# Patient Record
(demographics unavailable — no encounter records)

---

## 2024-11-29 NOTE — PHYSICAL EXAM
[Normal] : normal [None] : none [Avoidant] : avoidant [WNL] : within normal limits [Cooperative] : cooperative [Euthymic] : euthymic [Constricted] : constricted [Underproductive] : underproductive [Poverty of content] : poverty of content [Positive interaction] : positive interaction [FreeTextEntry7] : not enough to assess [de-identified] : not enough to assess [de-identified] : not enough to assess [de-identified] : not enough to assess [de-identified] : not enough to assess [de-identified] : not enough to assess

## 2024-11-29 NOTE — DISCUSSION/SUMMARY
[Low acute suicide risk] : Low acute suicide risk [No] : No [No, Reason: ____] : Safety Plan completed/updated (for individuals at risk): No, Reason: [unfilled]

## 2024-11-29 NOTE — RISK ASSESSMENT
[Collateral Sources] : Collateral Sources [None Known] : none known [No known risk factors] : No known risk factors [No known protective factors] : No known protective factors [No] : no

## 2024-11-29 NOTE — REASON FOR VISIT
[Behavioral Health Urgent Care Assessment] : a behavioral health urgent care assessment [School] : school [Patient] : patient [Mother] : mother

## 2024-11-29 NOTE — HISTORY OF PRESENT ILLNESS
[Not Applicable] : Not applicable [FreeTextEntry1] : Patient is a 5 year old male, domiciled with mom, dad, and 3 year old younger brother, attending kindergarden at [employer] in a self contained class, 15:1:1 additionally patient has a 1:1 aide (formerly attended [employer] pre k from ages 3-4), with PPHx significant for autism diagnosed at age 2, received DENA therapy in home from age 18 months to 3, had speech therapy three times a week through school starting at 18 months, started OPWDD 6 months ago PMH significant for spontaneous idiopathic urticaria, taking zysol 2.5 mg liquid at night time, no allergies, PSH, FH significant for autism in younger brother, father's aunt / uncle has possible hx of schizophrenia, referred by school psychologist, Eric Johansen,  for onset of behavioral issues such as patient having rigid behaviors and spitting / kicking as a result of changing routines as a result of the patient's autism.   Patient today presents with patient. Mom states that the main concerns are currently with the school. Mom states that at the moment she is looking for more support / resources to help the patient especially with the autism. Mom states that the patient had early intervention when he was 18 months old when they found out that he had a stimming behavior of needing to "spin" things, had issues with eye contact as well during this time (now resolved, but stimming now through "psychedelic videos on youtube". Mom states that he was officially diagnosed autism at age 2 through the ADOS and then had a repeat ADOS done 6 months ago showing the same results. Mom states that at 18 months of age the patient also had suffered from speech delay and then started to receive speech therapy. Patient did not babble until 2.5 years old and did not speak full words till 3. Mom states that the patient still struggles saying a limited vocabulary, but states that he is able to read a book aloud, but that he cannot recall what happened. Mom states that the patient in general does not have good recall and would not be able to recall things he was told nor his general day. Mom states that at around age 18 months he started to recieve DENA in home therapy that was partially helpful (though mom states that the therapists did not seem that great) until the patient started pre K. Mom states that at age 3 the patient started prek at [employer], and there were no behavioral issues at that time. Mom states that for kindergarden the patient did not meet criteria for continued BOSCES as he was determined to be likely at a higher function and was switched to his current school. Mom states that the patient generally has rigid behavior especially at home, and would have difficulty understanding rules / boundaries. Mom gives an example of patient coming back from school bus and wanting to look at mom's phone, when mom refused the patient threw a tantrum out of anger, would bite, kick and spit.   Mom states that through the school she receives parent interaction training twice a month (one for each child). Mom states that she would try to use the words / actions learned in training to deescalate the patient, but if the patient has a full melt down that the patient would not able to calm himself. Mom denies patient appearing sad or stressed, nor significant attention issues.   Mom states that the patient's brother also has an autism diagnosis but that the motor issues are more of a problem and that the brother is more social. Mom states that the brother gets along with the patient. Mom states that home life is stable, and denies hx of trauma.  Mom denies the patient appearing more depressed, the patient is generally a picky eater and avoids vegetables but for the most part has a healthy diet. Mom states that besides the idiopathic urticaria that is well controlled with 2.5 mg of liquid zysol that there is no medical concerns at this time. Mom states that during the pregnancy that he was born around 36 weeks, slightly heavy at around 7 lbs, and recieved 1 day of phototherapy but states that there were no other significant physical concerns aside from the speech issues later in life.  Mom states that the patient and his brother receive help through OPWDD but that they refuse to pay for anything medical but will pay for things like sports / museum passes.  Patient when interviewed as able to make eye contact briefly, but would only say 1 word, occasionally three words, but none of them were full grammatical sentences. Patient seemed mostly fixated on playing with the toys in the room, and particularly liked spinning toys. Patient was able to walk without issue in the room. Patient was able to name colors and objects. patient could draw a [place]angin but not a triangle. [FreeTextEntry2] : DENA therapy from 18 months to 3, then [facility] elizabeth from 3-4 which had DENA like class room incorporation [FreeTextEntry3] : none

## 2024-11-29 NOTE — SOCIAL HISTORY
[FreeTextEntry1] : Mom states that the patient's brother also has an autism diagnosis but that the motor issues are more of a problem and that the brother is more social. Mom states that the brother gets along with the patient. Mom states that home life is stable, and denies hx of trauma. [No Known Substance Use] : no known substance use

## 2024-12-24 NOTE — PHYSICAL EXAM
[NL] : moves all extremities x4, warm, well perfused x4 [Tired appearing] : not tired appearing [Lethargic] : not lethargic [Toxic] : not toxic [Erythema] : no erythema [Enlarged Tonsils] : tonsils not enlarged [Vesicles] : no vesicles [Exudate] : no exudate [Ulcerative Lesions] : no ulcerative lesions [Palate petechiae] : palate without petechiae [Wheezing] : no wheezing [Rales] : no rales [Crackles] : no crackles [Transmitted Upper Airway Sounds] : no transmitted upper airway sounds [Rhonchi] : no rhonchi [Soft] : soft [Tender] : nontender [de-identified] : CLUSTERED ERYTHEMATOUS LESION ON OUTER CORNERS OF LIPS, NO DRAINAGE OR SWELLING

## 2024-12-24 NOTE — REVIEW OF SYSTEMS
[Fever] : fever [Nasal Congestion] : nasal congestion [Sore Throat] : sore throat [Negative] : Genitourinary [Chills] : no chills [Malaise] : no malaise [Difficulty with Sleep] : no difficulty with sleep [Ear Pain] : no ear pain [Rash] : no rash

## 2024-12-24 NOTE — PLAN
[TextEntry] : START HSV ANTIVIRAL AS DISCUSSED -- RETURN IF NOT BETTER/WORSE  Rapid Strep NEGATIVE.  IF T/C + MAY CHANGE ABX TO AUGMENTIN TO RETREAT/COVER FOR STREP   Discussed to continue 6.5 ml of amoxicillin already prescribed, take entirety of dose   Symptomatic treatment. Maintain adequate hydration & rest. Warm Mist humidifier in room.  Nasal suctioning PRN if applicable Any fever >5 days, return to office. Stressed hand washing and infection control Pay close observation for new or worsening symptoms. Manage discomfort/fever as needed with OTC Acetaminophen and Ibuprofen (only if older than 6 months)   Instructed to return to office if condition worsens or new symptoms arise.

## 2024-12-24 NOTE — HISTORY OF PRESENT ILLNESS
[de-identified] : Mom reports strep in the house. She reports she was given Amoxicillin for Steve and he has been on it since Saturday prophylactically. Congestion and sneezing reported. New fever last night and night before per mom (100.5) at night. Decreased appetite and normal voiding. Last Tylenol at 4am. Mom also concerned about raw left hand knuckles. Wants checked for staph infection. Hand has been raw and irritated x3 days per mom. [FreeTextEntry6] : fever 104 and 100.5F TODAY, nasal congestion been on amox for few days for suspected strep, no rapid test done, mother and sibling + for strep few doses were missed from spitting out   no respiratory distress, no wheezing or dyspnea. No rashes, no lethargy No abdominal pain, no vomiting or diarrhea, stooling normally. Voiding normally, eating and drinking well.  GETS acyclovir for cold sores, has active breakout, not able to tolerate Abreeva -- licks it off, HAS HX OF COLD SORE/HSV LESIONS ON MOUTH, ESPECIALLY WHEN HE HAS VIRAL ILLNESS

## 2025-03-31 NOTE — REASON FOR VISIT
[FreeTextEntry1] :   Introduction:  I had the pleasure of seeing Steve Morrell (Charlie), age ~6.4 years, for a consultation.  Adán was accompanied today by his mother and grandmother.    HISTORY OF PRESENTING CONCERNS:    Concerns noted on our intake paperwork include:   -Autism management -behavioral issues -sensory input -OCD concern   Today's Concerns:   -ASD management -OCD type behaviors  Autism concern: -when younger sat in corner spinning things - generally not spinning now -atypical speech style -some echolalia and scripting type speech -at times over stimulated and may pinch/bite his brother - not trying to hurt him but excited -Eye contact: weak when younger, improving -Responding to name: reduced, improving -Shared enjoyment: reduced when younger, now at times - mostly with adults -Social interest: more interested in adults than peers, interested in being around peers, has some preferred peers but doesn't engage very much -Visual inspection: inspects -Lining up/sorting: when younger, generally not now -unusual visual regard: demonstrates -some body tensing/flapping when excited -no vocal stimming -Joint attention: reduced in the past, improving -some waving fingers/flicking -Play: plays with toys at times in intended manner - may drive cars around but once bored may repetitively drop on floor to hear the sound -Sensory: headphones when needed for some singing or loud toilet - wears alot in school. When younger disliked crying babies. Seeks out tickles. No major sensory tactile aversions -focuses on wheels/spinning items  OCD type behaviors: -wants people to sit in particular places in school or at home - gets upset if not -set schedule - if schedule changes gets very upset -rigid routines -if upset due to change in schedule, cry, throwing things, spitting, throwing self on floor, hitting/kicking anyone nearby - at times able to divert if notice him getting upset - 'lets go for a walk' - may help him calm. If gets upset can remain for 30 minutes. In school beginning to use prior -negatively impacting him in class - often meltdowns as peers not doing what he wants him to do, hard time getting through the day due to the meltdowns.  OPWDD: -approved -dov program - [facility], soft ball  Private services: -Home DENA - to begin - 10 hours - Achie[facility] behavior services  Safety:  -airtag -tries getting out of house, parents have locks on doors  Language/Communication:  -"All done tickles"  -speaks in multiword utterances, grammatical errors, speaks in 3rd person -able to follow multiple step instruction  Behavioral:  -generally compliant  Emotional:  -fair    ADAPTIVE FUNCTIONING:    Self-Care:  -tries dressing himself, may put on in wrong direction  Toileting:  -trained   Feeding:  -picky eater, nuggets, fries, cheese and bread, fruits. No vegetables. No smoothies, soups/stews -inspects each bit to make sure no spots or other food on the bite -very particular regarding the food - shapes, etc  Sleep:  -sleeps well   EDUCATIONAL HISTORY/INTERVENTIONS:  Academics:  -hyperlexic, diff responding to comprehension question, reads Dr. Rai books -good w/ math - can do mental addition, subtraction. Perhaps some multiplication  School Name: [employer] Grade: K Services:  -1:1 but not really in place although many aides in the classroom -SC 15:1 -SLT -OT -PT -ESY  *IEP (2025): -SC 15:1 -SLT ind and group -Parent training -OT -PT -Beh intervention services: 1 hr X  2 X / wk  PREVIOUS ASSESSMENTS/SERVICES:    *EI (18 months):   -DENA -SLT  *CPSE:   -ALISIA program- maybe 8 or 10   MEDICAL HISTORY:    Current Medications:   -Xyzal for urticaria  Medication History:  -none for behavior  Allergies:  -none    Birth History:   -born at 36 wks, 7lbs -insulin dependent DM, hypothyroid, fat malabsorption - pancreatic deficiency, Creon 10, syndthroid -C/S - failure to progress  ROS:  A 10-point review of systems was performed. No concerns regarding vision and hearing. No cardiovascular, respiratory, gastrointestinal, renal, endocrine, neurologic, or musculoskeletal concerns.    Allergy/Immunology: -chronic urticaria - started with school - if miss dose gets hives next day  Audiology:  -screened by PMD  Vision:  -none  Hospitalizations/ Surgeries:   -none   His father is a  His mother is a  He has a brother ()    There is a family history/extended family history of:  -Autism: cousin, brother -Depr: Aunts -other psychiatric issues: in Aunts, great aunts and uncles, cousin - Bipolar, Schizophrenia, and other suspected mental illnesses -Pancreatic insufficiency and DM: mother  There is no family history/extended family history of OCD, ADHD, intellectual disability, learning disabilities There is no history of congenital heart issues, heart rhythm problems, or of sudden death.    SOCIAL HISTORY:  -lives with his immediate family   PE (3/31/25):  Constitutional: Well appearing. No acute distress.  Dysmorphic Features: No dysmorphic features noted.  Skin: one hyperpigemented macule noted left abdomen, full skin exam not performed Eyes: Pupils, equal, round, reactive to light. Extraocular movements grossly intact.  Ear, Nose, Mouth, Throat: Moist mucous membranes. No pharyngeal injection. Normal appearing uvula and palate.  Cardiovascular: S1,S2.  Regular rate and rhythm.  Respiratory: Clear to auscultation bilaterally.  Gastrointestinal: Soft, non-tender, non-distended.  Musculoskeletal/Neuro: low tone, fair strength Motor: Normal appearing gait.     Observations (3/31/25):   -intermittent EC but overall reduced -some body tensing/hands fisted, and tongue moving around as watching/stimming on something watching on the phone -verbally requested tickles on arm from his mother -some echolalia -fair behavior, generally calm -atypical social approach -reduced EC -some shared enjoyment but limited -limited interest in presented toys - preferred visually stimulating videos -took checkerbox pizza plate and inspected as turned it around in difft directions  * LABORATORY/TEST RESULTS/DEVELOPMENTAL ASSESSMENTS:    ***EI eval (18 months - 2020): -limitations in use of gestures, freq of directed vocalizations, weaknesses in EC, response to JA, EC. Abundant shared enjoyment. Atypical behaviors noted __ADOS2:	 	-Total score 14, mod to severe  ***CSE eval: (5.0, 2024): *Psychoeducational: -SC 6:1:2 program -teacher reports sensitive to loud noise, easily redirected, can name peers, at times perseverates on completing pattern of toys he is playing with, starting to initiate more ssocially -testing rapport established, adequate effort, results considered valid estimates of his current fxing __Stanford-Binet Intelligence Scale: 5th Edition ()(SD=15): 	-Nonverbal IQ: 72 	-Verbal IQ: 64 -Full Scale IQ: 66  		-Fluid Reasonin 		-Knowledge: 60 		-Quantitative: 86 		-Visual Spatial: 91 		-Working Memory:  57 __YCAT-2: 	-Early Achievement Composite: 76 	-Gen Info: 1 (scaled score) 	-Readin 	-Math: 10 	-Writing: 10 	-Spoken lan *SLT: __CELF-5 (): 	-Core Language: 64 -Recp Lan 	-Expr Lan 	-Lang Content: 70  	-Lang Structure:  56  (5.4 yr, 2024): __ADOS2:	 -Comparison score 8 - high level ASD sxs __CARS2: 	-Score - 33, mild to moderate  *Annual report (BOCES, 2025): -making progress, learns best w/ discrete trials  Gordon Assessment    -Informant: Caregiver on Intake Form  -Inattention:  -Hyperactivity/Impulsivity:

## 2025-03-31 NOTE — PLAN
[FreeTextEntry3] : 1. Requested:  -Teacher intake form and Teacher CLEO form -Parent CLEO form -Most recent PT and OT  evaluation reports      2. Next visit:  (ADOS recently performed), discuss reccs -review requested info    3. Discussed:  -DENA about to begin, beh consultant in place in school -locks/airtag in place for safety -OPWDD in place -diet: in general no recc diets, and things like gluten free diet may be overly restrictive for him -can consider medication overtime if needed for OCD type behaviors - but beh interventions being started now - so can give them time. If needed consider SSRI  -if needed next visit can be remote or just with parent but better able to answer particular questions if child is present  4. Following completion of this two part initial consultation, a consultation report will be completed and sent out to the child's pediatrician and the child's parents.    Plan to discuss next visit:    1. Follow Up: Follow up with Dr. Coleman is offered in TBD.  2. Medication: -can consider SSRI if needed to address OCD type behaviors -Parentsmedguide.org - Autism  3. Medical:  *Audiology:   A comprehensive Audiology evaluation to rule out any hearing impairment is recommended and can be scheduled at [facility] by calling [phone], or can be performed at a local Audiology center as may be recommended by your pediatrician.  *Genetic Testing:  I recommend that your child be evaluated by the Genetics Department. An appointment can be made for our Genetics department by [phone]  4. Resources:  The following tool kit was created for families of children with a new diagnosis of autism and provides useful information regarding a new diagnosis and accessing services. It is available for review at: https://www.autismspeaks.org/family-services/tool-kits/100-day-kit  Additional resources in your area may also be found at:   http://www.thearc.org/page.aspx?mjz=5679 This organization is a national community-based organization advocating for and serving people with intellectual and developmental disabilities.  -FELIPE:  https://www.felipe.org/  -[facility]:  http://familycenterforautism.org/  5. Educational Services/Interventions:  National law dictates that individuals with disabilities or developmental delays have a right to free and appropriate public education (Individuals with Disabilities Education Act-IDEA).   A.  Your child requires a full day, full year intensive educational program through the local school district.   B. These educational services will be available through your local school district's Committee on  Special Education (CPSE), and as your child turns 5 through the Committee on Special Education (CSE).   C. Your child's intervention program should include structured behavioral interventions, such as Applied Behavior Analysis (DENA). Treatment should be implemented by a qualified , who should receive supervision from a BCBA.  In order to provide your child with optimal opportunities to succeed at home, school, and with peers, as well as to support your child's overall development, a comprehensive and intensive special education program, through an individualized education plan (IEP), appropriate for children with autism spectrum disorders, should be put into place. Key educational plans should specifically address needs related to:  a. Verbal and nonverbal communication needs. b. Development of social interaction skills and proficiencies.  c. Impact of unusual responses to sensory experiences, resistance to environmental change, and engagement in repetitive activities and stereotyped movements.   d. Positive behavioral interventions, strategies, and supports to address any behavioral difficulties resulting from an autism spectrum disorder. e. Other needs resulting from the child's disability that impact progress in the curriculum, including social and emotional development.   D. Because of the risk of behavioral regression and potential loss of skills, your child's therapeutic and school services should be offered full year. This should include ancillary services as well. Your child's summer school program should be similar to the school year program in terms of teacher's qualifications, number of hours per week, and teaching methods utilized.  6. Private DENA (Applied Behavioral Analysis) services may be available through your child's insurance. It is recommended that you contact your insurance provider and ask to speak to the Autism  if one exists in your plan. You can also ask to review your coverage given your child's autism diagnosis and ask about insurance coverage for medical necessary autism services such as DENA.   7. Additional services may be available to your child through the Office for People with Developmental Disabilities (OPWDD). Information can be found at https://www.opwdd.[place].gov/opwdd_services_supports/children This [place] organization offers services and support to children and adults with developmental disabilities.  Parent to Parent [facility] (https://www.ptopnys.org/)-  is an organization that may be able to assist in the application process and accessing appropriate services from OPWDD.  8. It is recommended that you share this report and recommendations with your child's Early Intervention or School/IEP team.  9.  Safety/Wandering Information: The following websites may offer some supports to prevent bolting/wandering behaviors and to assist in finding a lost child if lost/wandered away: -http://Local.com.Recruits.com/big-red-safety-box/ -https://www.autismspeaks.org/family-services/resource-library/locating-devices -https://www.autismspeaks.org/wandering-prevention-resources -[place] -obit -[facility] department/Police Department - Project [facility]  10.  Social Skills: Some social skills groups and information regarding social groups/social pragmatics may be found at the following sites/groups: -FELIPE.org -[facility] for Autism -Kid Esteem (https://www.kidesteem.com/) -Behavioral Intervention Psychological Services (http://www.[employer]wt[employer].com)  -some universities may also have groups available ([facility], [facility], [place], [facility], etc.) -Bridging Kids -DESIR Plans program -FlipKey

## 2025-05-05 NOTE — PLAN
[FreeTextEntry3] :  1. Follow Up: Follow up with Dr. Coleman is offered in ~Nov/Dec.  Requested shortly prior to next visit: -IEP -Teacher intake form and Teacher CLEO form -Parent CLEO form  2. Medication: -parent opts to defer for now. May consider overtime. Home DENA to begin shortly -[email or web address] - Autism __Reviewed different options of medications to address Nestors behavior. In brief: -SSRI: to target ~OCD type of behaviors/rigidity -Non-stimulant medications such as Guanfacine - may target ADHD symptoms and behavior -Stimulant medication (Ritalin/Adderall type): to target ADHD symptoms -Abilify: to target ~OCD behaviors and behavioral challenges (requires bloodwork, perhaps more possible consequential side effects) __Discussed effect/side effect of medication can be less predictable with a family history of mental illness __Discussed likely if considering would start with SSRI or Guanfacine, and can consider stimulant overtime  3. Medical:  *Audiology:   A comprehensive Audiology evaluation to rule out any hearing impairment is recommended and can be scheduled at [facility] by calling [phone], or can be performed at a local Audiology center as may be recommended by your pediatrician.  *Genetic Testing:  I recommend that your child be evaluated by the Genetics Department. An appointment can be made for our Genetics department by [phone]  4. Resources:  The following tool kit was created for families of children with a new diagnosis of autism and provides useful information regarding a new diagnosis and accessing services. It is available for review at: https://www.autismspeaks.org/family-services/tool-kits/100-day-kit  Additional resources in your area may also be found at:   http://www.thearc.org/page.aspx?sfn=2195 This organization is a national community-based organization advocating for and serving people with intellectual and developmental disabilities.  -FELIPE:  https://www.felipe.org/  -[facility]:  http://familycenterforautism.org/  5. Educational Services/Interventions:  National law dictates that individuals with disabilities or developmental delays have a right to free and appropriate public education (Individuals with Disabilities Education Act-IDEA).   A.  Your child requires a full day, full year intensive educational program through the local school district.   B. In order to provide your child with optimal opportunities to succeed at home, school, and with peers, as well as to support your child's overall development, a comprehensive and intensive special education program, through an individualized education plan (IEP), appropriate for children with autism spectrum disorders, should continue. Key educational plans should specifically address needs related to:  a. Verbal and nonverbal communication needs. b. Development of social interaction skills and proficiencies.  c. Impact of unusual responses to sensory experiences, resistance to environmental change, and engagement in repetitive activities and stereotyped movements.   d. Positive behavioral interventions, strategies, and supports to address any behavioral difficulties resulting from an autism spectrum disorder. e. Other needs resulting from the child's disability that impact progress in the curriculum, including social and emotional development.   C. Because of the risk of behavioral regression and potential loss of skills, your child's therapeutic and school services should be offered full year. This should include ancillary services as well. Your child's summer school program should be similar to the school year program in terms of teacher's qualifications, number of hours per week, and teaching methods utilized.  6. Private DENA (Applied Behavioral Analysis) services may be available through your child's insurance. It is recommended that you contact your insurance provider and ask to speak to the Autism  if one exists in your plan. You can also ask to review your coverage given your child's autism diagnosis and ask about insurance coverage for medical necessary autism services such as DENA.   7. Additional services may be available to your child through the Office for People with Developmental Disabilities (OPWDD). Information can be found at https://www.opwdd.[place].gov/opwdd_services_supports/children This [place] organization offers services and support to children and adults with developmental disabilities.  Parent to Parent [facility] (https://www.ptopnys.org/)-  is an organization that may be able to assist in the application process and accessing appropriate services from OPWDD.  8. It is recommended that you share this report and recommendations with your child's IEP team.  9.  Safety/Wandering Information: The following websites may offer some supports to prevent bolting/wandering behaviors and to assist in finding a lost child if lost/wandered away: -http://Lyksismassociation.org/big-red-safety-box/ -https://www.autismspeaks.org/family-services/resource-library/locating-devices -https://www.autismspeaks.org/wandering-prevention-resources -[employer] -[employer] -'s department/Police Department - [facility]  10.  Social Skills: Some social skills groups and information regarding social groups/social pragmatics may be found at the following sites/groups: -FELIPE.org -[facility] -Kid Esteem (https://www.kidesteem.com/) -Behavioral Intervention Psychological Services (http://www.LumenzserCityGro.com)  -some [facility] may also have groups available ([facility], [facility], [facility], etc.) -Bridging Kids -DESIR Plans program -[employer]

## 2025-05-05 NOTE — REASON FOR VISIT
[Home] : at home, [unfilled] , at the time of the visit. [Medical Office: ([facility])___] : at the medical office located in  [Telehealth (audio & video)] : This visit was provided via telehealth using real-time 2-way audio visual technology. [FreeTextEntry1] :   Introduction:  I had the pleasure of seeing Steve Morrell (Charlie), age ~6.4 years, for a consultation.  Adán was accompanied by his mother and grandmother during the first visit. I met w/ his mother for the second visit.    HISTORY OF PRESENTING CONCERNS:    Concerns noted on our intake paperwork include:   -Autism management -behavioral issues -sensory input -OCD concern   Today's Concerns:   -ASD management -OCD type behaviors  Autism concern: -when younger sat in corner spinning things - generally not spinning now -atypical speech style -some echolalia and scripting type speech -at times over stimulated and may pinch/bite his brother - not trying to hurt him but excited -Eye contact: weak when younger, improving -Responding to name: reduced, improving -Shared enjoyment: reduced when younger, now at times - mostly with adults -Social interest: more interested in adults than peers, interested in being around peers, has some preferred peers but doesn't engage very much -Visual inspection: inspects -Lining up/sorting: when younger, generally not now -unusual visual regard: demonstrates -some body tensing/flapping when excited -no vocal stimming -Joint attention: reduced in the past, improving -some waving fingers/flicking -Play: plays with toys at times in intended manner - may drive cars around but once bored may repetitively drop on floor to hear the sound -Sensory: headphones when needed for some singing or loud toilet - wears alot in school. When younger disliked crying babies. Seeks out tickles. No major sensory tactile aversions -focuses on wheels/spinning items  OCD type behaviors: -wants people to sit in particular places in school or at home - gets upset if not -set schedule - if schedule changes gets very upset -rigid routines -if upset due to change in schedule, cry, throwing things, spitting, throwing self on floor, hitting/kicking anyone nearby - at times able to divert if notice him getting upset - 'lets go for a walk' - may help him calm. If gets upset can remain for 30 minutes -negatively impacting him in class - often meltdowns as peers not doing what he wants him to do, hard time getting through the day due to the meltdowns. -somewhat obsessive about certain peers, particularly 2 girls in his class  ADHD concern: -focusing issues -impulsive -up and down during meals -if preferred activity can maintain attn for fair amount of time, hard to maintain attn if less preferred  OPWDD: -approved -dov program - swimming, soft ball  Private services: -Home DENA - to begin  2025 - 10 hours   Safety:  -airtag -tries getting out of house, parents have locks on doors  Language/Communication:  -"All done tickles"  -speaks in multiword utterances, grammatical errors, speaks in 3rd person -able to follow multiple step instruction  Behavioral:  -very self-directed - quickly gets upset if doesn't want to do something - hits/bites/scratches/smash things -if overstimulated/brother touches his things - will aggress towards brother. May wait for parent to leave the room before hurting his brother. At times aggress/hits brother without trigger. Needs supervision all day long. May hit/bite brother -without trigger. Doesn't explain why did so.  -doesn't seem to understand consequences -some compliance  Emotional:  -fair  -doesn't seem to understand emotions -in social situations seemed scared of other children   ADAPTIVE FUNCTIONING:    Self-Care:  -tries dressing himself, may put on in wrong direction  Toileting:  -trained   Feeding:  -picky eater, nuggets, fries, cheese and bread, fruits. No vegetables. No smoothies, soups/stews -inspects each bit to make sure no spots or other food on the bite -very particular regarding the food - shapes, etc  Sleep:  -sleeps well   EDUCATIONAL HISTORY/INTERVENTIONS:  Academics:  -hyperlexic, diff responding to comprehension question, reads Dr. Rai books -good w/ math - can do mental addition, subtraction. Perhaps some multiplication  School Name: [employer] Grade: K Services:  -1:1 but not really in place although many aides in the classroom -SC 15:1 -SLT -OT -PT -ESY -Home services - DENA from school district  - in place until private Home DENA in place -BIP  *IEP (2025): -SC 15:1 -SLT ind and group -Parent training -OT -PT -Beh intervention services: 1 hr X  2 X / wk  *Annual review (2025): __Educational: -strong foundational skills -able to demonstrate his potential in small group and 1:1  -highly successful when learning about topics of interest/choice -needs to improve ability to attend to tasks/classwork -should develop skills to persevere when presented w/ unpreferred tasks -needs to improve ability  to adapt to his environment and follow teacher directives consistently -Reading: strong foundational skills - can read higher level text, struggles w/ reading comprehension -gets frustrated if reading group reading at slower pace usually allowed to read ahead to avoid frustration -Writing: tends to shut down if told to write about something he doesn't want to -Math: greatest area of strength - needs support to remain focused and complete lessons -can be very distracted by extraneous stimuli in class and internally distracted by scripting, needs 1:1 support to remain focused/available for learning.needs redirection during lessons and classwork tasks -can be self-directed during lessons..may express he is done w/ assignment when it hasn't been completed -often moves/rolls around on the carpet -when focused can complete his work  quickly and efficiently -occasionally participates in lessons w/ prompting -beh can be rigid can cause him to fixate on a peer or situation - attempts to control his environment - where people are seated/which teacher he works with.becomes self-directed when unable to do so -when dysregulated - hits, throws, elopes, becomes non-compliant -needs extensive teacher support to regulate his emotions and  regain control -below avg social skills - at times shows interest in his classmates yet requires extensive support to initiate and maintain interactions w/ them -needs prompting for EC and to engage conversationally  PREVIOUS ASSESSMENTS/SERVICES:    *EI (18 months):   -DENA -SLT  *CPSE:   -BOCES program- maybe 8 or 10   MEDICAL HISTORY:    Current Medications:   -Xyzal for urticaria  Medication History:  -none for behavior  Allergies:  -none    Birth History:   -born at 36 wks, 7lbs -insulin dependent DM, hypothyroid, fat malabsorption - pancreatic deficiency, Creon 10, syndthroid -C/S - failure to progress  ROS:  A 10-point review of systems was performed. No concerns regarding vision and hearing. No cardiovascular, respiratory, gastrointestinal, renal, endocrine, neurologic, or musculoskeletal concerns.    Allergy/Immunology: -chronic urticaria - started with school - if miss dose gets hives next day  Audiology:  -screened by PMD  Vision:  -none  Hospitalizations/ Surgeries:   -none   FAMILY HISTORY:  His father is a  His mother is a  He has a brother ()    There is a family history/extended family history of:  -Autism: cousin, brother -Depr: Aunts -other psychiatric issues: in Aunts, great aunts and uncles, cousin - Bipolar, Schizophrenia, and other suspected mental illnesses -Pancreatic insufficiency and DM: mother  There is no family history/extended family history of OCD, ADHD, intellectual disability, learning disabilities There is no history of congenital heart issues, heart rhythm problems, or of sudden death.    SOCIAL HISTORY:  -lives with his immediate family   PE (3/31/25):  Constitutional: Well appearing. No acute distress.  Dysmorphic Features: No dysmorphic features noted.  Skin: one hyperpigemented macule noted left abdomen, full skin exam not performed Eyes: Pupils, equal, round, reactive to light. Extraocular movements grossly intact.  Ear, Nose, Mouth, Throat: Moist mucous membranes. No pharyngeal injection. Normal appearing uvula and palate.  Cardiovascular: S1,S2.  Regular rate and rhythm.  Respiratory: Clear to auscultation bilaterally.  Gastrointestinal: Soft, non-tender, non-distended.  Musculoskeletal/Neuro: low tone, fair strength Motor: Normal appearing gait.     Observations (3/31/25):   -intermittent EC but overall reduced -some body tensing/hands fisted, and tongue moving around as watching/stimming on something watching on the phone -verbally requested tickles on arm from his mother -some echolalia -fair behavior, generally calm -atypical social approach -reduced EC -some shared enjoyment but limited -limited interest in presented toys - preferred visually stimulating videos -took [employer] pizza plate and inspected as turned it around in difft directions  * LABORATORY/TEST RESULTS/DEVELOPMENTAL ASSESSMENTS:    ***EI eval (18 months - 2020): -limitations in use of gestures, freq of directed vocalizations, weaknesses in EC, response to JA, EC. Abundant shared enjoyment. Atypical behaviors noted __ADOS2:	 	-Total score 14, mod to severe  ***CSE eval: (5.0, 2024): *Psychoeducational: -SC 6:1:2 program -teacher reports sensitive to loud noise, easily redirected, can name peers, at times perseverates on completing pattern of toys he is playing with, starting to initiate more socially -testing rapport established, adequate effort, results considered valid estimates of his current fxing __Stanford-Binet Intelligence Scale: 5th Edition (SS)(SD=15): 	-Nonverbal IQ: 72 	-Verbal IQ: 64 -Full Scale IQ: 66  		-Fluid Reasonin 		-Knowledge: 60 		-Quantitative: 86 		-Visual Spatial: 91 		-Working Memory:  57 __YCAT-2: 	-Early Achievement Composite: 76 	-Gen Info: 1 (scaled score) 	-Readin 	-Math: 10 	-Writing: 10 	-Spoken lan *SLT: __CELF-5 (SS): 	-Core Language: 64 -Recp Lan 	-Expr Lan 	-Lang Content: 70  	-Lang Structure:  56  (5.4 yr, 2024): __ADOS2:	 -Comparison score 8 - high level ASD sxs __CARS2: 	-Score - 33, mild to moderate  *Annual report (BOCGUNJAN, 2025): -making progress, learns best w/ discrete trials  Kimberly Assessment    -Informant: Caregiver on Intake Form  -Inattention:  -Hyperactivity/Impulsivity:     __Child and Adolescent Symptom Inventory-5(CLEO-5): (2025) Informant: Teacher - Familia  -Academics: about GL and some above w/ support -inattention:   -hyperactivity/impulsivity:  -oppositional and defiant behaviors: 3/8 -conduct disorder: minimally endorsed -anxiety symptoms: many endorsed -depressive symptoms: some endorsed -social deficits/language deficits/restricted and repetitive interests/behaviors: many endorsed Select endorsed as 'often/very often:' -highly distractible Comments: -sweet boy, struggles w/ any unexpected change in routine/environment/structure -often preoccupied w/ controlling his environment or other people -very bright - yet requires full support to fx in the classroom -still developing social skills to maintain peer interactions -tends to throw items/furniture  when dysregulated -needs teacher support to calm himself down (2025) Informant: Parent  -inattention:   -hyperactivity/impulsivity:  -oppositional and defiant behaviors: 3/8 -conduct disorder: minimally endorsed -anxiety symptoms: some endorsed  -depressive symptoms: not endorsed -social deficits/language deficits/restricted and repetitive interests/behaviors: many endorsed

## 2025-05-31 NOTE — DISCUSSION/SUMMARY
[Normal Growth] : growth [Normal Development] : development [None] : No known medical problems [No Elimination Concerns] : elimination [No Feeding Concerns] : feeding [No Skin Concerns] : skin [Normal Sleep Pattern] : sleep [School Readiness] : school readiness [Mental Health] : mental health [Nutrition and Physical Activity] : nutrition and physical activity [Oral Health] : oral health [Safety] : safety [No Medications] : ~He/She~ is not on any medications [Patient] : patient [FreeTextEntry1] : SCHOOL READINESS: Discussed established routines, after-school care and activities, parent-teacher communications, friends, bullying, maturity, management of disappointments/fears.  MENTAL HEALTH: Discussed family time, routines, temper problems, social interactions.  NUTRITION AND PHYSICAL ACTIVITY: Discussed healthy weight, appropriate well-balanced diet, increased fruit/vegetable/whole grain consumption, adequate calcium intake, 60 minutes of exercise a day.  ORAL HEALTH: Discussed regular visits with dentist, daily brushing and flossing, adequate fluoride.   SAFETY: Discussed pedestrian safety, booster seat, safety helmets, swimming safety, child sexual abuse prevention, fire escape/drill plan and smoke detectors, carbon monoxide detectors/alarms, guns. Follow up with developmental peds 5-2-1-0 questionnaire reviewed and I discussed components of 5-2-1-0 healthy living with patient and family.   Recommended 5 servings of fruits and vegetables a day, less than 2 hours of screen time per day, 1 hour of exercise per day and zero sugar sweetened beverages.  Parent(s) have no issues or concerns with weight Discussed in the preferred language of English

## 2025-05-31 NOTE — PHYSICAL EXAM
[Alert] : alert [No Acute Distress] : no acute distress [Conjunctivae with no discharge] : conjunctivae with no discharge [PERRL] : PERRL [Auricles Well Formed] : auricles well formed [Clear Tympanic membranes with present light reflex and bony landmarks] : clear tympanic membranes with present light reflex and bony landmarks [No Discharge] : no discharge [Nares Patent] : nares patent [Pink Nasal Mucosa] : pink nasal mucosa [Palate Intact] : palate intact [Nonerythematous Oropharynx] : nonerythematous oropharynx [Supple, full passive range of motion] : supple, full passive range of motion [No Palpable Masses] : no palpable masses [Symmetric Chest Rise] : symmetric chest rise [Clear to Auscultation Bilaterally] : clear to auscultation bilaterally [Regular Rate and Rhythm] : regular rate and rhythm [Normal S1, S2 present] : normal S1, S2 present [No Murmurs] : no murmurs [+2 Femoral Pulses] : +2 femoral pulses [Soft] : soft [NonTender] : non tender [Non Distended] : non distended [Normoactive Bowel Sounds] : normoactive bowel sounds [No Hepatomegaly] : no hepatomegaly [No Splenomegaly] : no splenomegaly [Testicles Descended Bilaterally] : testicles descended bilaterally [Patent] : patent [No fissures] : no fissures [No Abnormal Lymph Nodes Palpated] : no abnormal lymph nodes palpated [No Gait Asymmetry] : no gait asymmetry [No pain or deformities with palpation of bone, muscles, joints] : no pain or deformities with palpation of bone, muscles, joints [Normal Muscle Tone] : normal muscle tone [Straight] : straight [Cranial Nerves Grossly Intact] : cranial nerves grossly intact [No Rash or Lesions] : no rash or lesions [Eder: _____] : Eder [unfilled] [Circumcised] : circumcised [FreeTextEntry1] : very uncooperative during exam

## 2025-05-31 NOTE — HISTORY OF PRESENT ILLNESS
[Normal] : Normal [No] : No cigarette smoke exposure [Water heater temperature set at <120 degrees F] : Water heater temperature set at <120 degrees F [Car seat in back seat] : Car seat in back seat [Carbon Monoxide Detectors] : Carbon monoxide detectors [Smoke Detectors] : Smoke detectors [Supervised outdoor play] : Supervised outdoor play [Parents] : parents [FreeTextEntry7] : 6yr well visit  [FreeTextEntry1] : Sees developmental pediatrician for ASD and associated issues Has chronic urticaria and eczema, on xyzal and steroid creams Toilet trained  Uncooperative during exam Has seen dentist recently Takes MVI Reviewed cardiac and lead questionnaires, no issues